# Patient Record
(demographics unavailable — no encounter records)

---

## 2025-04-03 NOTE — DEVELOPMENTAL MILESTONES
[Normal Development] : Normal Development [None] : none [Cuts most foods with a knife] : cuts most foods with a knife [Ties shoes] : ties shoes [Is dry day and night] : is dry day and night [Chooses preferred foods] : chooses preferred foods [Starts/continues conversation with peers] : starts/continues conversation with peers [Plays and interacts with at least one] : plays and interacts with at least one "best friend" [Counts 10 objects] : counts 10 objects [FreeTextEntry1] : History of speech and motor delay. Mother is happy with his development progress.

## 2025-04-03 NOTE — DISCUSSION/SUMMARY
[None] : No known medical problems [No Elimination Concerns] : elimination [No Feeding Concerns] : feeding [No Skin Concerns] : skin [Normal Sleep Pattern] : sleep [Excessive Weight Gain] : excessive weight gain [BMI ___] : body mass index of [unfilled] [Delayed Gross Motor Skills] : delayed gross motor skills [Delayed Language Skills] : delayed language skills [School Readiness] : school readiness [Mental Health] : mental health [Nutrition and Physical Activity] : nutrition and physical activity [Safety] : safety [Patient] : patient [Mother] : mother [FreeTextEntry1] : Liam Krishna is a 6-year-old male, presenting to the clinic for his 6-year WCC. His mother expresses three concerns: yellow eye discharge for the past week, a constant, macular rash over his trunk and back for "awhile," and excessive ear wax. We counseled his mother to use antihistamine eye drops ~3x/day for the eye discharge. For the rash, we recommended she gently exfoliate Liam's skin every day during his bath, while also using Cerave Salicylic Acid body wash (or a similar brand). After bathing, we suggested she use Cerave lotion, followed by Aquaphor/Vaseline, to lock in the moisture. For the excess ear wax, we suggested his mother use an OTC bulb syringe to vacuum out the wax at home.   Additionally, we also discussed Liam's weight, which is currently in the 97th percentile BMI (increased from 79th percentile at last year's WCC). We emphasized how his weight could cause future health problems. To that effect, we provided suggestions on improving diet and portion size.   If there are no other acute concerns, we will plan to see Liam next year, for his 7-year-old WCC.

## 2025-04-03 NOTE — PHYSICAL EXAM
[Alert] : alert [No Acute Distress] : no acute distress [Normocephalic] : normocephalic [Conjunctivae with no discharge] : conjunctivae with no discharge [PERRL] : PERRL [EOMI Bilateral] : EOMI bilateral [Auricles Well Formed] : auricles well formed [No Discharge] : no discharge [Nares Patent] : nares patent [Pink Nasal Mucosa] : pink nasal mucosa [Palate Intact] : palate intact [Nonerythematous Oropharynx] : nonerythematous oropharynx [Supple, full passive range of motion] : supple, full passive range of motion [No Palpable Masses] : no palpable masses [Symmetric Chest Rise] : symmetric chest rise [Clear to Auscultation Bilaterally] : clear to auscultation bilaterally [Regular Rate and Rhythm] : regular rate and rhythm [Normal S1, S2 present] : normal S1, S2 present [No Murmurs] : no murmurs [+2 Femoral Pulses] : +2 femoral pulses [Soft] : soft [NonTender] : non tender [Non Distended] : non distended [Normoactive Bowel Sounds] : normoactive bowel sounds [No Hepatomegaly] : no hepatomegaly [No Splenomegaly] : no splenomegaly [Circumcised] : circumcised [Testicles Descended Bilaterally] : testicles descended bilaterally [Patent] : patent [No fissures] : no fissures [No Abnormal Lymph Nodes Palpated] : no abnormal lymph nodes palpated [No Gait Asymmetry] : no gait asymmetry [No pain or deformities with palpation of bone, muscles, joints] : no pain or deformities with palpation of bone, muscles, joints [Normal Muscle Tone] : normal muscle tone [Straight] : straight [Cranial Nerves Grossly Intact] : cranial nerves grossly intact [FreeTextEntry3] : Increased buildup of earwax [de-identified] : Macular rash across trunk and back

## 2025-04-03 NOTE — REVIEW OF SYSTEMS
[Eye Discharge] : eye discharge [Rash] : rash [Dry Skin] : dry skin [Itching] : itching [Negative] : Genitourinary

## 2025-04-03 NOTE — HISTORY OF PRESENT ILLNESS
[Mother] : mother [Fruit] : fruit [Vegetables] : vegetables [Meat] : meat [Grains] : grains [Eggs] : eggs [Fish] : fish [Dairy] : dairy [___ stools per day] : [unfilled]  stools per day [Normal] : Normal [In own bed] : In own bed [Playtime (60 min/d)] : Playtime 60 min a day [Appropiate parent-child-sibling interaction] : Appropriate parent-child-sibling interaction [Child Cooperates] : Child cooperates [Parent has appropriate responses to behavior] : Parent has appropriate responses to behavior [Grade ___] : Grade [unfilled] [No] : No cigarette smoke exposure [Up to date] : Up to date [NO] : No [Yes] : Patient goes to dentist yearly [Special Education] : receives special education  [Car seat in back seat] : Car seat in back seat [Carbon Monoxide Detectors] : Carbon monoxide detectors [Smoke Detectors] : Smoke detectors [Supervised outdoor play] : Supervised outdoor play [FreeTextEntry7] : None [de-identified] : Eye discharge x1 week, macular rash on trunk and back, and excess ear wax [FreeTextEntry3] : 10 hours/night [de-identified] : Receives Speech Therapy and OT [FreeTextEntry1] : Liam Krishna is a 6-year-old male who presents for his 6-year WCC. He is accompanied by his mother and 4yo sister.